# Patient Record
Sex: FEMALE | Race: WHITE
[De-identification: names, ages, dates, MRNs, and addresses within clinical notes are randomized per-mention and may not be internally consistent; named-entity substitution may affect disease eponyms.]

---

## 2021-01-19 ENCOUNTER — HOSPITAL ENCOUNTER (EMERGENCY)
Dept: HOSPITAL 96 - M.ERS | Age: 31
End: 2021-01-19
Payer: COMMERCIAL

## 2021-01-19 VITALS — HEIGHT: 63 IN | WEIGHT: 238.01 LBS | BODY MASS INDEX: 42.17 KG/M2

## 2021-01-19 VITALS — DIASTOLIC BLOOD PRESSURE: 83 MMHG | SYSTOLIC BLOOD PRESSURE: 131 MMHG

## 2021-01-19 DIAGNOSIS — Z88.6: ICD-10-CM

## 2021-01-19 DIAGNOSIS — F41.9: ICD-10-CM

## 2021-01-19 DIAGNOSIS — Z88.5: ICD-10-CM

## 2021-01-19 DIAGNOSIS — R07.89: Primary | ICD-10-CM

## 2021-01-20 NOTE — EKG
[place] [address]
Phone:  [phone]                     ELECTROCARDIOGRAM REPORT      
_______________________________________________________________________________
 
Name:         LUKE,APRIL                    Room:                     [facility] 
M.RMp#:    [id number]     Account #:     [id number]  
Admission:    21    Attend Phys:                     
Discharge:                Date of Birth: [birth year]  
Date of Service: 21 173  Report #:      [id number]
        [id number]
_______________________________________________________________________________
THIS REPORT FOR:  //name//                      
 
                         [facility] ED
                                       
Test Date:    2021               Test Time:    17:38:53
Pat Name:     CLARA PIERRE               Department:   
Patient ID:   [id number]            Room:          
Gender:                               Technician:   AL
:          1990               Requested By: Braxton Payton
Order Number: [id number] MD:   Isaac Bocanegra
                                 Measurements
Intervals                              Axis          
Rate:         76                       P:            36
MA:           137                      QRS:          50
QRSD:         82                       T:            47
QT:           370                                    
QTc:          417                                    
                           Interpretive Statements
Sinus rhythm
No previous ECG available for comparison
Electronically Signed On 2021 10:00:34 CST by Isaac Bocanegra
[email or web address]
 
 
 
 
 
 
 
 
 
 
 
 
 
 
 
 
 
 
 
 
 
 
  <ELECTRONICALLY SIGNED>
                                           By: Isaac Bocanegra MD, Located within [facility]      
  21     1000
D: 21 1738   _____________________________________
T: 21 1738   Isaac Bocanegra MD, FACC        /EPI